# Patient Record
Sex: MALE | Race: AMERICAN INDIAN OR ALASKA NATIVE
[De-identification: names, ages, dates, MRNs, and addresses within clinical notes are randomized per-mention and may not be internally consistent; named-entity substitution may affect disease eponyms.]

---

## 2021-12-01 ENCOUNTER — HOSPITAL ENCOUNTER (INPATIENT)
Dept: HOSPITAL 43 - DL.MS | Age: 71
LOS: 2 days | Discharge: HOME | DRG: 640 | End: 2021-12-03
Payer: MEDICARE

## 2021-12-01 DIAGNOSIS — I49.9: ICD-10-CM

## 2021-12-01 DIAGNOSIS — N18.6: ICD-10-CM

## 2021-12-01 DIAGNOSIS — Z87.891: ICD-10-CM

## 2021-12-01 DIAGNOSIS — N25.89: ICD-10-CM

## 2021-12-01 DIAGNOSIS — I12.9: ICD-10-CM

## 2021-12-01 DIAGNOSIS — Z20.822: ICD-10-CM

## 2021-12-01 DIAGNOSIS — E87.1: ICD-10-CM

## 2021-12-01 DIAGNOSIS — R11.0: ICD-10-CM

## 2021-12-01 DIAGNOSIS — N18.30: ICD-10-CM

## 2021-12-01 DIAGNOSIS — E87.6: ICD-10-CM

## 2021-12-01 DIAGNOSIS — Z79.4: ICD-10-CM

## 2021-12-01 DIAGNOSIS — E78.5: ICD-10-CM

## 2021-12-01 DIAGNOSIS — E11.22: ICD-10-CM

## 2021-12-01 DIAGNOSIS — Z99.2: ICD-10-CM

## 2021-12-01 DIAGNOSIS — I10: ICD-10-CM

## 2021-12-01 DIAGNOSIS — G47.00: ICD-10-CM

## 2021-12-01 DIAGNOSIS — E11.40: ICD-10-CM

## 2021-12-01 DIAGNOSIS — D72.829: ICD-10-CM

## 2021-12-01 DIAGNOSIS — I12.0: ICD-10-CM

## 2021-12-01 DIAGNOSIS — E88.1: Primary | ICD-10-CM

## 2021-12-01 DIAGNOSIS — I25.10: ICD-10-CM

## 2021-12-01 DIAGNOSIS — G89.29: ICD-10-CM

## 2021-12-01 DIAGNOSIS — H40.9: ICD-10-CM

## 2021-12-01 LAB
ANION GAP SERPL CALC-SCNC: 14.1 MEQ/L (ref 7–13)
ANION GAP SERPL CALC-SCNC: 18.5 MEQ/L (ref 7–13)
CHLORIDE SERPL-SCNC: 81 MMOL/L (ref 98–107)
CHLORIDE SERPL-SCNC: 85 MMOL/L (ref 98–107)
SARS-COV-2 RNA RESP QL NAA+PROBE: NEGATIVE
SODIUM SERPL-SCNC: 122 MMOL/L (ref 136–145)
SODIUM SERPL-SCNC: 123 MMOL/L (ref 136–145)

## 2021-12-01 PROCEDURE — 93005 ELECTROCARDIOGRAM TRACING: CPT

## 2021-12-01 PROCEDURE — 36415 COLL VENOUS BLD VENIPUNCTURE: CPT

## 2021-12-01 PROCEDURE — G0378 HOSPITAL OBSERVATION PER HR: HCPCS

## 2021-12-01 PROCEDURE — 82947 ASSAY GLUCOSE BLOOD QUANT: CPT

## 2021-12-01 PROCEDURE — 0240U: CPT

## 2021-12-01 PROCEDURE — 71045 X-RAY EXAM CHEST 1 VIEW: CPT

## 2021-12-01 PROCEDURE — 85025 COMPLETE CBC W/AUTO DIFF WBC: CPT

## 2021-12-01 PROCEDURE — 83735 ASSAY OF MAGNESIUM: CPT

## 2021-12-01 PROCEDURE — G0379 DIRECT REFER HOSPITAL OBSERV: HCPCS

## 2021-12-01 PROCEDURE — 80048 BASIC METABOLIC PNL TOTAL CA: CPT

## 2021-12-01 PROCEDURE — 80053 COMPREHEN METABOLIC PANEL: CPT

## 2021-12-01 PROCEDURE — 85027 COMPLETE CBC AUTOMATED: CPT

## 2021-12-01 RX ADMIN — ONDANSETRON SCH MG: 4 TABLET, ORALLY DISINTEGRATING ORAL at 21:39

## 2021-12-01 RX ADMIN — ISOSORBIDE MONONITRATE SCH MG: 60 TABLET, FILM COATED, EXTENDED RELEASE ORAL at 20:09

## 2021-12-01 RX ADMIN — DORZOLAMIDE HYDROCHLORIDE AND TIMOLOL MALEATE SCH DROP: 20; 5 SOLUTION/ DROPS OPHTHALMIC at 22:41

## 2021-12-01 RX ADMIN — HEPARIN SODIUM SCH: 5000 INJECTION, SOLUTION INTRAVENOUS; SUBCUTANEOUS at 21:39

## 2021-12-01 RX ADMIN — SODIUM CHLORIDE PRN MG: 9 INJECTION, SOLUTION INTRAVENOUS at 16:44

## 2021-12-02 LAB
ANION GAP SERPL CALC-SCNC: 12.8 MEQ/L (ref 7–13)
ANION GAP SERPL CALC-SCNC: 16.2 MEQ/L (ref 7–13)
CHLORIDE SERPL-SCNC: 90 MMOL/L (ref 98–107)
CHLORIDE SERPL-SCNC: 94 MMOL/L (ref 98–107)
SODIUM SERPL-SCNC: 127 MMOL/L (ref 136–145)
SODIUM SERPL-SCNC: 129 MMOL/L (ref 136–145)

## 2021-12-02 RX ADMIN — SODIUM CHLORIDE PRN MG: 9 INJECTION, SOLUTION INTRAVENOUS at 10:34

## 2021-12-02 RX ADMIN — ISOSORBIDE MONONITRATE SCH MG: 60 TABLET, FILM COATED, EXTENDED RELEASE ORAL at 20:58

## 2021-12-02 RX ADMIN — ISOSORBIDE MONONITRATE SCH MG: 60 TABLET, FILM COATED, EXTENDED RELEASE ORAL at 09:16

## 2021-12-02 RX ADMIN — ONDANSETRON SCH MG: 4 TABLET, ORALLY DISINTEGRATING ORAL at 05:49

## 2021-12-02 RX ADMIN — ONDANSETRON SCH MG: 4 TABLET, ORALLY DISINTEGRATING ORAL at 14:58

## 2021-12-02 RX ADMIN — DORZOLAMIDE HYDROCHLORIDE AND TIMOLOL MALEATE SCH DROP: 20; 5 SOLUTION/ DROPS OPHTHALMIC at 21:01

## 2021-12-02 RX ADMIN — ONDANSETRON SCH MG: 4 TABLET, ORALLY DISINTEGRATING ORAL at 20:59

## 2021-12-02 RX ADMIN — VITAMIN D, TAB 1000IU (100/BT) SCH MCG: 25 TAB at 09:16

## 2021-12-02 RX ADMIN — HEPARIN SODIUM SCH: 5000 INJECTION, SOLUTION INTRAVENOUS; SUBCUTANEOUS at 21:02

## 2021-12-02 RX ADMIN — HEPARIN SODIUM SCH: 5000 INJECTION, SOLUTION INTRAVENOUS; SUBCUTANEOUS at 15:40

## 2021-12-02 RX ADMIN — INSULIN GLARGINE SCH: 100 INJECTION, SOLUTION SUBCUTANEOUS at 10:15

## 2021-12-02 RX ADMIN — LACTULOSE SCH GM: 10 SOLUTION ORAL at 09:26

## 2021-12-02 RX ADMIN — HEPARIN SODIUM SCH: 5000 INJECTION, SOLUTION INTRAVENOUS; SUBCUTANEOUS at 05:55

## 2021-12-02 RX ADMIN — DORZOLAMIDE HYDROCHLORIDE AND TIMOLOL MALEATE SCH DROP: 20; 5 SOLUTION/ DROPS OPHTHALMIC at 09:27

## 2021-12-02 NOTE — PN
DATE:  12/02/2021

 

SUBJECTIVE:  The patient was seen today.  He says he is feeling significantly

better.  He has only minimal nausea, rated like 2 in turn compared with the time

when he arrived to hospital when his nausea was 7 to 9 intense.  The patient's

electrolytes corrected and he was given mild hydration and he is tolerating

clear liquid diet.  The patient was walked today and he felt well.  He did not

have nausea while walking in hallway.  Discussed with nephrologist today.  She

recommended to correct his electrolytes with potassium around 4, and the patient

can be discharged home without diuretic medication, and to follow with her in

clinic as outpatient.

 

OBJECTIVE:  Vital Signs:  Today, his temperature 98.3, pulse rate 72, blood

pressure 168/73, respiratory rate 20, and oxygen by pulse oximetry 98%.

 

LABORATORY DATA:  In the morning, his WBC was 12, hemoglobin 13.2, and platelets

259.  Sodium was 127, potassium 2.2, chloride 90, CO2 of 24, BUN 47, creatinine

3.05.  Glucose 145.  Calcium 8.9, total bilirubin 0.7, AST 15, ALT 15, alkaline

phosphatase 55.  Repeat blood work at 4 p.m., his WBC was 10.6, hemoglobin 12.2,

hematocrit 32.3, platelet count is 248.  Sodium 129, potassium 2.8, chloride 94,

CO2 of 26, anion gap 12.8, BUN 45, creatinine 2.25.  We have glucose 155 and

calcium 8.4.

 

ASSESSMENT AND PLAN:

1. Intractable nausea resolved with correction of the electrolytes likely due

    to hyponatremia.  Hypokalemia improved to 3.8 in the afternoon.  Can

    discontinue cardiac monitor and will monitor potassium tomorrow.

2. Hyponatremia is correcting to 129 today in the afternoon.  Will continue

    with mild normal saline hydration.

3. Hypertension.  The patient will continue with clonidine.  It is still

    uncontrolled.  He is on clonidine 0.2 p.o. 3 times a day, labetalol 300 mg

    p.o. 3 times a day.  Hydralazine was given around the clock 25 mg p.o. 3

    times a day.  Will increase hydralazine to 50 mg p.o. 3 times a day and

    will continue with amlodipine 10 mg p.o. daily.

4. Hyperlipidemia.  The patient will continue with rosuvastatin 5 mg p.o.

    daily on Monday, Wednesday, and Friday.

5. Leukocytosis is improving.  Off antibiotics.  Chest x-ray done did not show

    any acute pulmonary disease.

6. Diabetes mellitus.  We will continue with insulin glargine 18 units

    subcutaneous at bedtime, insulin lispro sliding scale with meals, and

    continue with home regimen 8 units of insulin subcutaneous with breakfast,

    12 units subcutaneous with lunch, and 30 units subcutaneous with dinner.

7. Coronary artery disease.  The patient will be continued with isosorbide

    mononitrate 60 mg p.o. b.i.d.

8. Also, for nausea, the patient was given pantoprazole 40 mg p.o. daily.  He

    has acid reflux too and will address also his acid reflux.

9. For deep vein thrombosis prophylaxis, the patient is given heparin 5000

    units subcutaneous every 8 hours.

 

Plan of treatment was discussed with nephrologist and she agreed with the plan

of treatment.  The patient is for discharge tomorrow morning if no other acute

issue with the patient, and to follow up with Nephrology in the clinic.

 

DD:  12/02/2021 19:22:57

DT:  12/02/2021 20:45:37

[facility]

Job #:  [id number]

## 2021-12-02 NOTE — CR
PROCEDURE INFORMATION: 

Exam: XR Chest 

Exam date and time: 12/1/2021 10:54 PM 

Age: 71 years old 

Clinical indication: Other: Leucocytosis 



TECHNIQUE: 

Imaging protocol: XR of the chest. 

Views: 1 view. 



COMPARISON: 

No relevant prior studies available. 



FINDINGS: 

Lungs: Unremarkable. No consolidation. 

Pleural spaces: Unremarkable. No pleural effusion. No pneumothorax. 

Heart/Mediastinum: Unremarkable. No cardiomegaly. 

Bones/joints: There is hardware in the cervical spine. Degenerative changes 

involve the spine and shoulders. 



IMPRESSION: 

No evidence for acute pulmonary disease.

## 2021-12-02 NOTE — HP
HISTORY OF PRESENT ILLNESS:  The patient is a 71-year-old man, who was sent to

the hospital as direct admit by the nephrologist.  The patient for the past 7 to

8 days complains of upset stomach and dry heaves, especially when he stands.  He

feels he has some generalized weakness.  He denies any vomiting or diarrhea.  No

abdominal pain.  No history of COVID exposure or previous COVID.  The patient

has past medical history of diabetes; CKD, status post 3 emergency dialysis in

11/2013; he also has hypertension, uncontrolled; hyperlipidemia; and glaucoma.

The patient was found in the clinic to have a potassium of 3 and sodium was 123

with urea of 49 and creatinine 3.3.  The patient's GFR around 20.  The patient's

nephrologist is Dr. Orellana, and she recommended the patient to be admitted

here and to have IV fluids normal saline and supplementation with potassium, and

to be reevaluated in the morning.  The patient took ondansetron on Monday, but

did not have improvement of his dry heaves.  The patient at home on

chlorthalidone.  I discussed with nephrologist.  She recommended to stop

chlorthalidone, and the patient to be given proton pump inhibitor trial.

 

REVIEW OF SYSTEMS:

Twelve-point review of systems is negative except as in history of present

illness.

 

PAST MEDICAL HISTORY:  The patient has diabetes; CKD, status post 3 emergency

dialysis in 2013; hypertension; arrhythmia; hyperlipidemia; and history of

chronic pain.

 

ALLERGIES:  The patient does not have any known allergies.

 

 

 

SOCIAL HISTORY:  The patient smoked, quit 18 years ago, and he smoked for about

27 years 1 pack per day.  No alcohol and no drug use.

 

FAMILY HISTORY:  Noncontributory.

 

PHYSICAL EXAMINATION:

Vital Signs:  At admission; temperature 98.4, pulse 66, blood pressure 187/70

with oxygen saturation of 100% on room air, mean blood pressure 110, and

respiratory rate 16.

General:  The patient is alert and oriented x3.

HEENT:  Head is normocephalic and atraumatic.  Pupils are equally reactive to

light.

Neck:  Supple.  No thyromegaly.  No lymphadenopathy.

Heart:  S1 and S2.  Regular rhythm and rate.  No murmur.

Lungs:  Clear to auscultation bilaterally.

Abdomen:  Soft.  Nontender.  Positive bowel sounds.

Extremities:  No edema.

Neurologic:  There are no gross focal neurologic deficits.

 

LABORATORY DATA:  At admission; WBC 13.9, hemoglobin 13, hematocrit 35, and

platelet count is 255.  Neutrophils are 87.4, lymphocytes 6.1, monocytes 6.3,

and eosinophils 0.1.  Sodium at admission 122, potassium 2.5, chloride 81,

carbon dioxide 25, anion gap 18.5, BUN 47, creatinine 2.33, glucose 202,

estimated creatinine clearance 20.05, calcium 9.4, total bilirubin 0.9, AST 16,

ALT 15, and alkaline phosphatase 71.

 

ASSESSMENT AND PLAN:  This is a 71-year-old man with stage 4 kidney disease, who

was sent from the clinic to the hospital due to intractable dry heaves,

hyponatremia, and hypokalemia.  At admission, the patient was found to have

uncontrolled hypertension and leukocytosis.

1. Intractable nausea:  We will give the patient ondansetron IV 4 mg q.6 hours

    p.r.n. for nausea.

2. Hypokalemia:  It was 2.5 at admission.  The patient will be placed on

    cardiac monitor, and we will supplement potassium with potassium IV 10 mEq

    riders over 1 hour, and potassium 40 mEq p.o., and recheck potassium level.

3. Hyponatremia:  We will hold chlorthalidone, and we will give the patient

    normal saline IV fluids.

4. Hypertension, uncontrolled:  The patient will be continued with his home

    medications which are clonidine 0.2 p.o. 3 times a day and labetalol 300 mg

    p.o. 3 times a day.  We will give the patient hydralazine 25 mg p.o. q.6

    hours p.r.n. for systolic blood pressure more than 160.  The patient will

    continue with amlodipine 10 mg p.o. daily.

5. Hyperlipidemia:  The patient will be continued with rosuvastatin 5 mg p.o.

    on Monday, Wednesday, and Friday.

6. Leukocytosis:  We will repeat CBC in the morning.  We will order a chest x-

    ray and urinalysis.

7. Diabetes mellitus:  We will continue the patient with insulin glargine 18

    units subcutaneous at bedtime, insulin lispro sliding scale with meals and

    at bedtime, and the patient will also receive 8 units of insulin

    subcutaneous with breakfast, 12 units subcutaneous with lunch, and 13 units

    subcutaneous with dinner.

8. Coronary artery disease:  The patient will be continued with isosorbide

    mononitrate 60 mg p.o. b.i.d.

9. Dry heaves:  Also the patient was given 40 mg p.o. daily of pantoprazole

    sodium.

10.Deep venous thrombosis prophylaxis:  The patient will be given heparin 5000

    units subcutaneous q.8 hours.

 

DD:  12/01/2021 22:53:40

DT:  12/02/2021 00:24:13

[facility]

Job #:  [id number]

## 2021-12-03 LAB
ANION GAP SERPL CALC-SCNC: 14.1 MEQ/L (ref 7–13)
CHLORIDE SERPL-SCNC: 96 MMOL/L (ref 98–107)
SODIUM SERPL-SCNC: 131 MMOL/L (ref 136–145)

## 2021-12-03 RX ADMIN — DORZOLAMIDE HYDROCHLORIDE AND TIMOLOL MALEATE SCH DROP: 20; 5 SOLUTION/ DROPS OPHTHALMIC at 09:05

## 2021-12-03 RX ADMIN — LACTULOSE SCH GM: 10 SOLUTION ORAL at 08:54

## 2021-12-03 RX ADMIN — ISOSORBIDE MONONITRATE SCH MG: 60 TABLET, FILM COATED, EXTENDED RELEASE ORAL at 08:55

## 2021-12-03 RX ADMIN — INSULIN GLARGINE SCH: 100 INJECTION, SOLUTION SUBCUTANEOUS at 09:02

## 2021-12-03 RX ADMIN — ONDANSETRON SCH MG: 4 TABLET, ORALLY DISINTEGRATING ORAL at 06:05

## 2021-12-03 RX ADMIN — VITAMIN D, TAB 1000IU (100/BT) SCH MCG: 25 TAB at 08:59

## 2021-12-03 RX ADMIN — HEPARIN SODIUM SCH: 5000 INJECTION, SOLUTION INTRAVENOUS; SUBCUTANEOUS at 06:05

## 2021-12-03 RX ADMIN — HEPARIN SODIUM SCH: 5000 INJECTION, SOLUTION INTRAVENOUS; SUBCUTANEOUS at 14:06

## 2021-12-04 NOTE — DISCH
DISCHARGE DIAGNOSES:  Intractable nausea, generalized weakness, hypokalemia,

hyponatremia, end-stage kidney disease.

 

SECONDARY DIAGNOSES:  Diabetes mellitus, insulin dependent; hypertension,

uncontrolled; chronic kidney disease, stage 4; renal tubular acidosis 4;

neuropathy; glaucoma; insomnia; and chronic pain, on tramadol.

 

HOSPITAL COURSE:  The patient is a 71-year-old man, who was sent to the hospital

as a direct admit by the nephrologist.  The patient for the past 7 to 8 days

complains of upset stomach, dry hives.  Especially when he stands, he feels that

he has some generalized weakness.  He denies any vomiting or diarrhea.  No

abdominal pain.  No history of COVID exposure or previous COVID.  The patient

has past medical history of diabetes, CKD, status post 3 emergency dialyses in

11/2013.  He also has hypertension, uncontrolled at admission; hyperlipidemia;

and glaucoma.

 

The patient was found in the clinic to have a potassium of 3.  At admission in

the hospital, that showed potassium of 2.5.  His sodium at admission was 122.

GFR was 20.

 

Discussed with the patient's nephrologist, Dr. Orellana, and she recommended the

patient to be admitted in the hospital and to have IV hydration with normal

saline.  To stop all his diuretics, Lasix, and chlorthalidone.  The patient to

be supplemented by electrolytes and to be re-evaluated tomorrow.  Also, she

recommended the patient to receive a trial of PPI.  In the hospital, the patient

had supplementation of his potassium with potassium chloride, and his potassium

improved to 3.8 the next day, and thereafter his potassium was back to 3.1, and

the patient also had supplementation of his potassium.  His hypertension was

uncontrolled, and he was given additional hydralazine 25 mg every 6 hours

p.r.n., which was changed to scheduled and later on was increased to hydralazine

50 mg p.o. 2 times a day.  The patient's nausea subsided and he was stable to be

discharged home and to follow up with Dr. Orellana on Tuesday at 12 p.m.

appointment.  The patient to have blood work at that time.

 

LABORATORY DATA:  Blood work done today shows WBC of 10.9, hemoglobin 11.9,

hematocrit 32.3, and platelet count is 242.  Sodium 131; potassium 3.1, which

was supplemented; chloride 96; anion gap 24; BUN 14.1; creatinine 3.22.  His

glucose was 171, calcium of 8.5, AST 12, ALT 12, albumin 3.3, globulin 2.1.

 

Vital Signs:  Today, temperature 99, pulse rate 65, blood pressure 170/63, and

oxygen saturation 98%.  The patient had increased on hydralazine to 50 mg 3

times a day.

 

PHYSICAL EXAMINATION:

HEENT:  Head:  Atraumatic, normocephalic.  Pupils equally reactive to light.

Neck:  Supple.  No thyromegaly.  No lymphadenopathy.

Abdomen:  Soft, nontender.  Positive bowel sounds.

Heart:  S1, S2.  Regular rhythm and rate.

Lungs:  Clear to auscultation bilaterally.

Extremities:  No edema.

Neurologic:  He is alert, oriented x3.  No gross focal neurologic deficits.

 

The patient was discharged home with potassium chloride 20 mEq daily and

Protonix 40 mg p.o. daily and hydralazine 50 mg p.o. 3 times a day in addition

to his home medications.

 

DD:  12/03/2021 19:18:15

DT:  12/04/2021 02:10:17

[facility]

Job #:  [id number]